# Patient Record
Sex: FEMALE | Race: WHITE | NOT HISPANIC OR LATINO | URBAN - METROPOLITAN AREA
[De-identification: names, ages, dates, MRNs, and addresses within clinical notes are randomized per-mention and may not be internally consistent; named-entity substitution may affect disease eponyms.]

---

## 2019-09-28 ENCOUNTER — EMERGENCY (EMERGENCY)
Facility: HOSPITAL | Age: 24
LOS: 1 days | Discharge: HOME | End: 2019-09-28
Attending: EMERGENCY MEDICINE | Admitting: EMERGENCY MEDICINE
Payer: COMMERCIAL

## 2019-09-28 VITALS
TEMPERATURE: 98 F | OXYGEN SATURATION: 98 % | HEART RATE: 100 BPM | DIASTOLIC BLOOD PRESSURE: 80 MMHG | SYSTOLIC BLOOD PRESSURE: 129 MMHG | RESPIRATION RATE: 18 BRPM

## 2019-09-28 LAB
ETHANOL SERPL-MCNC: 196 MG/DL — HIGH (ref 0–10)
HCG SERPL-ACNC: <0 MIU/ML — SIGNIFICANT CHANGE UP

## 2019-09-28 PROCEDURE — 99284 EMERGENCY DEPT VISIT MOD MDM: CPT

## 2019-09-28 PROCEDURE — 84702 CHORIONIC GONADOTROPIN TEST: CPT

## 2019-09-28 PROCEDURE — 80307 DRUG TEST PRSMV CHEM ANLYZR: CPT

## 2019-09-28 PROCEDURE — 36415 COLL VENOUS BLD VENIPUNCTURE: CPT

## 2019-09-28 PROCEDURE — 99285 EMERGENCY DEPT VISIT HI MDM: CPT

## 2019-09-28 RX ORDER — SODIUM CHLORIDE 9 MG/ML
1000 INJECTION INTRAMUSCULAR; INTRAVENOUS; SUBCUTANEOUS ONCE
Refills: 0 | Status: COMPLETED | OUTPATIENT
Start: 2019-09-28 | End: 2019-09-28

## 2019-09-28 RX ORDER — ONDANSETRON 8 MG/1
4 TABLET, FILM COATED ORAL ONCE
Refills: 0 | Status: COMPLETED | OUTPATIENT
Start: 2019-09-28 | End: 2019-09-28

## 2019-09-28 RX ADMIN — ONDANSETRON 4 MILLIGRAM(S): 8 TABLET, FILM COATED ORAL at 01:24

## 2019-09-28 RX ADMIN — SODIUM CHLORIDE 1000 MILLILITER(S): 9 INJECTION INTRAMUSCULAR; INTRAVENOUS; SUBCUTANEOUS at 01:43

## 2019-09-28 NOTE — ED PROVIDER NOTE - NSFOLLOWUPINSTRUCTIONS_ED_ALL_ED_FT
Immediately return to the Emergency Department or call 911 for any high fever, trouble breathing, severe vomiting, worsening pain, or any other concerns.    Alcohol Intoxication.     Alcohol intoxication occurs when the amount of alcohol that a person has consumed impairs his or her ability to mentally and physically function. Chronic alcohol consumption can also lead to a variety of health issues including neurological disease, stomach disease, heart disease, liver disease, etc. Do not drive after drinking alcohol. Drinking enough alcohol to end up in an Emergency Room suggests you may have an alcohol abuse problem. Seek help at a drug addiction center.    SEEK IMMEDIATE MEDICAL CARE IF YOU HAVE ANY OF THE FOLLOWING SYMPTOMS: seizures, vomiting blood, blood in your stool, lightheadedness/dizziness, or becoming shaky to tremulous when you stop drinking.

## 2019-09-28 NOTE — ED PROVIDER NOTE - PATIENT PORTAL LINK FT
You can access the FollowMyHealth Patient Portal offered by [facility] by registering at the following website: [email or web address]. By joining [facility]’s FollowMyHealth portal, you will also be able to view your health information using other applications (apps) compatible with our system.

## 2019-09-28 NOTE — ED ADULT NURSE NOTE - CHPI ED NUR SYMPTOMS NEG
no abdominal distension/no blood in stool/no burning urination/no fever/no vomiting/no chills/no nausea/no diarrhea/no dysuria/no hematuria

## 2019-09-28 NOTE — ED PROVIDER NOTE - CLINICAL SUMMARY MEDICAL DECISION MAKING FREE TEXT BOX
24F no sig pmh p/w concern for involuntary intox vs alcohol intoxication. Per mother & pt, was out w/ friends, had only a few drinks, felt far more intoxicated than she felt normal for prior episodes. Mother also noted significant intoxication not normal for her baseline. Pt is not etoh naive. Denies any sexual assault, no physical assault. No specific potential assailants. Pt also felt mildly nausea and the need to do deep breathing. No cp, nof/c, no vomiting. No abd pain. No hallucinations. No SI/HI. Exam noted for min tachycardia, slurred speech. No e/o Trauma. Concern alcohol intoxication, consider involutnary ingestion, dehydration, consider drug intoxication. Discussed pro/con. ETOH level elevated despite being at home w/ family since approx 11pm. Discussed findings. pt does not want to wait for ua/tox. Ambulating w/ steady gait. Has mother to monitor at home. Feeling much improved, no acute life threatening illness. Pt seeking discharge.

## 2019-09-28 NOTE — ED PROVIDER NOTE - OBJECTIVE STATEMENT
24F no sig pmh p/w concern for involuntary intox vs alcohol intoxication. Per mother & pt, was out w/ friends, had only a few drinks, felt far more intoxicated than she felt normal for prior episodes. Mother also noted significant intoxication not normal for her baseline. Pt is not etoh naive.   Denies any sexual assault, no physical assault. No specific potential assailants. Pt also felt mildly nausea and the need to do deep breathing.   No cp, nof/c, no vomiting. No abd pain. No hallucinations. No SI/HI.

## 2019-09-28 NOTE — ED PROVIDER NOTE - PHYSICAL EXAMINATION
VITAL SIGNS: I have reviewed nursing notes and confirm.  CONSTITUTIONAL: Well-developed; well-nourished; mild distress.  SKIN: Skin is warm and dry, no acute rash.  HEAD: Normocephalic; atraumatic.  EYES:  EOM intact; conjunctiva and sclera clear.PERRL  ENT: No nasal discharge; airway clear.  NECK: Supple; Voluntary FROM  CARD: No rubs appreciated, tachycardic rate and rhythm.  RESP: No wheezes, no rales. No respiratory distress  ABD: Soft; non-distended; non-tender; no rebound or guarding  EXT: Normal ROM. No cyanosis or edema.  NEURO: Alert, oriented. Grossly unremarkable. minimally slurred speech. Ambulating w/ steady gait.   PSYCH: Cooperative, appropriate.

## 2019-10-02 DIAGNOSIS — R11.2 NAUSEA WITH VOMITING, UNSPECIFIED: ICD-10-CM

## 2019-10-02 DIAGNOSIS — F10.120 ALCOHOL ABUSE WITH INTOXICATION, UNCOMPLICATED: ICD-10-CM
